# Patient Record
Sex: FEMALE | Race: WHITE
[De-identification: names, ages, dates, MRNs, and addresses within clinical notes are randomized per-mention and may not be internally consistent; named-entity substitution may affect disease eponyms.]

---

## 2023-06-09 ENCOUNTER — HOSPITAL ENCOUNTER (OUTPATIENT)
Dept: HOSPITAL 47 - RADECHMAIN | Age: 68
Discharge: HOME | End: 2023-06-09
Attending: FAMILY MEDICINE
Payer: MEDICARE

## 2023-06-09 DIAGNOSIS — R01.1: ICD-10-CM

## 2023-06-09 DIAGNOSIS — I35.0: Primary | ICD-10-CM

## 2023-06-09 PROCEDURE — 93306 TTE W/DOPPLER COMPLETE: CPT

## 2023-06-09 NOTE — CA
Transthoracic Echo Report 

 Name: Vicenta Parker 

 MRN:    [id number] 

 Age:    68     Gender:     F 

 

 :    1955 

 Exam Date:     2023 13:25 

 Exam Location: [facility] 

 Ht (in):     65     Wt (lb):     200 

 Ordering Physician:        Hunter Win MD 

 Attending/Referring Phys:         Audelia LUNA 

 Technician         Brittany Funez RDCS 

 Procedure CPT: 

 Indications:       R01.1 

 

 Cardiac Hx: 

 Technical Quality:      Fair 

 Contrast 1:                                Total Dose (mL): 

 Contrast 2:                                Total Dose (mL): 

 

 MEASUREMENTS  (Male / Female) Normal Values 

 2D ECHO 

 LV Diastolic Diameter PLAX        3.6 cm                4.2 - 5.9 / 3.9 - 5.3 cm 

 LV Systolic Diameter PLAX         2.4 cm                 

 IVS Diastolic Thickness           1.3 cm                0.6 - 1.0 / 0.6 - 0.9 cm 

 LVPW Diastolic Thickness          1.4 cm                0.6 - 1.0 / 0.6 - 0.9 cm 

 LV Relative Wall Thickness        0.7                    

 RV Internal Dim ED PLAX           2.7 cm                 

 LVOT Diameter                     1.8 cm                 

 LA Volume                         65.8 cm???              18 - 58 / 22 - 52 cm??? 

 

 DOPPLER 

 AV Peak Velocity                  258.4 cm/s             

 AV Peak Gradient                  26.7 mmHg              

 AV Mean Velocity                  181.4 cm/s             

 AV Mean Gradient                  14.7 mmHg              

 AV Velocity Time Integral         49.9 cm                

 AI Peak Velocity                  417.3 cm/s             

 AI Peak Gradient                  69.6 mmHg              

 AI Pressure Half Time             566.4 ms               

 LVOT Peak Velocity                128.1 cm/s             

 LVOT Peak Gradient                6.6 mmHg               

 LVOT Velocity Time Integral       26.7 cm                

 LVOT Stroke Volume                65.3 cm???               

 LVOT Stroke Volume Index          33.0 ml/m???             

 LVOT Cardiac Index                2203.1 cm???/min???m???      

 AV Area Cont Eq vti               1.3 cm???                

 AV Area Cont Eq pk                1.2 cm???                

 MV Area PHT                       4.0 cm???                

 Mitral E Point Velocity           68.0 cm/s              

 Mitral A Point Velocity           101.3 cm/s             

 Mitral E to A Ratio               0.7                    

 MV Deceleration Time              189.9 ms               

 MV E' Velocity                    5.3 cm/s               

 Mitral E to MV E' Ratio           12.7                   

 TR Peak Velocity                  237.9 cm/s             

 TR Peak Gradient                  22.6 mmHg              

 Right Ventricular Systolic Press  27.3 mmHg              

 

 

 FINDINGS 

 Left Ventricle 

 Mildly increased left ventricular wall thickness. Left ventricular cavity size  

 normal. Normal left ventricular systolic function with no obvious regional wall  

 motion abnormalities. Left ventricular ejection fraction is estimated at 55-60  

 %. 

 

 Right Ventricle 

 Normal right ventricular size and function. Right ventricular systolic pressure  

 within normal limits. 

 

 Right Atrium 

 Normal right atrial size. 

 

 Left Atrium 

 Moderately increased left atrial volume. Mildly increased left atrial area. 

 

 Mitral Valve 

 Structurally normal mitral valve. No mitral stenosis, regurgitation or  

 prolapse. 

 

 Aortic Valve 

 Trileaflet aortic valve. Mild aortic stenosis with a peak gradient of 27 mmHg  

 and a mean gradient of 15 mmHg. Mild aortic regurgitation. 

 

 Tricuspid Valve 

 Structurally normal tricuspid valve. Mild tricuspid regurgitation. 

 

 Pulmonic Valve 

 Trace pulmonic regurgitation. 

 

 Pericardium 

 No pericardial effusion. 

 

 Aorta 

 Normal size aortic root and proximal ascending aorta. 

 

 CONCLUSIONS 

 Normal LV systolic function 

 Aortic sclerosis with mild-to-moderate aortic stenosis and mild aortic  

 insufficiency 

 Previewed by:  

 Dr. Jay Lawson MD 

 (Electronically Signed) 

 Final Date:      2023 18:17

## 2023-10-16 ENCOUNTER — HOSPITAL ENCOUNTER (OUTPATIENT)
Dept: HOSPITAL 47 - RADMAMWWP | Age: 68
Discharge: HOME | End: 2023-10-16
Attending: FAMILY MEDICINE
Payer: MEDICARE

## 2023-10-16 DIAGNOSIS — Z80.3: ICD-10-CM

## 2023-10-16 DIAGNOSIS — Z78.0: ICD-10-CM

## 2023-10-16 DIAGNOSIS — Z12.31: Primary | ICD-10-CM

## 2023-10-16 PROCEDURE — 77067 SCR MAMMO BI INCL CAD: CPT

## 2023-10-16 PROCEDURE — 77063 BREAST TOMOSYNTHESIS BI: CPT

## 2023-10-18 NOTE — MM
Reason for Exam: Screening  (asymptomatic). 

Last mammogram was performed 2 year(s) and 0 month(s) ago. 





Patient History: 

Menarche at age 13. First Full-Term Pregnancy at age 26. Postmenopausal.

Maternal aunt had breast cancer at or over age 50. 





Risk Values: 

Maira 5 year model risk: 1.9%.

NCI Lifetime model risk: 6.2%.





Prior Study Comparison: 

10/1/2020 Bilateral Screening Mammogram, [facility]. 10/8/2021 Bilateral

Screening Mammogram, [facility]. 





Tissue Density: 

The breast tissue is heterogeneously dense. This may lower the sensitivity of mammography.





Findings: 

Analyzed By CAD. 

There is no suspicious group of microcalcifications or new suspicious mass in either breast. 





Overall Assessment: Negative, BI-RAD 1





Management: 

Screening Mammogram of both breasts in 1 year.

.



Patient should continue monthly self-breast exams.  A clinical breast exam by your physician is

recommended on an annual basis.

This exam should not preclude additional follow-up of suspicious palpable abnormalities.



Note on Maira scores and lifetime risk:

1. A Maira score greater than 3% is considered moderate risk. If this is the case, consider

specialist referral to assess eligibility for a risk reducing agent.

2. If overall lifetime risk for the development of breast cancer is 20% or higher, the patient may

qualify for future screening with alternating mammogram and breast MRI.



Electronically signed and approved by: Leopold M. Fregoli, M.D. Radiologis